# Patient Record
Sex: MALE | Race: WHITE | NOT HISPANIC OR LATINO | Employment: STUDENT | URBAN - METROPOLITAN AREA
[De-identification: names, ages, dates, MRNs, and addresses within clinical notes are randomized per-mention and may not be internally consistent; named-entity substitution may affect disease eponyms.]

---
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
## 2023-09-18 NOTE — PROGRESS NOTES
Subjective:       Patient ID: Alcon Hernandez is a 18 y.o. male.    Chief Complaint: scalp scaling     HPI   Patient was seen in August for scalp scaling tinea capitis tat was diagnosed during this visit and he was started on fluconazole with no relief. He has had this for around a year with no resolution with antifungal shampoos. He has white scaling diffusely spread around his head with no scales on his forehead or face. This scaling has worsened and fluconazole did not help. He has no drainage or boggy areas. No hair loss.        PMHx: denies  Allergies: NKDA  Meds: denies  FHx: denies  SHx: Denies EtoH, illicit drug use and Tobacco abuse.      Review of Systems  as per HPI   Objective:      Vitals:    10/08/18 1607   BP: 102/68   Pulse: 77     Physical Exam   Constitutional: He appears well-developed and well-nourished.   HENT:   Head: Atraumatic.   Scalp white matted scales diffusely spread.    Eyes: Conjunctivae and EOM are normal. Pupils are equal, round, and reactive to light.   Neck: Normal range of motion. Neck supple.   Cardiovascular: Normal rate, regular rhythm and normal heart sounds.   Pulmonary/Chest: Effort normal and breath sounds normal.   Abdominal: Soft. Bowel sounds are normal.   Skin: Skin is warm. Capillary refill takes less than 2 seconds.   Excoriations noted throughout.        Assessment:       1. Seborrheic dermatitis of scalp      Plan:       Alcon was seen today for follow-up.    Diagnoses and all orders for this visit:    Seborrheic dermatitis of scalp  -     ketoconazole (NIZORAL) 2 % shampoo; Apply topically twice a week.  -     Ambulatory referral to Dermatology  Clobetasol solution phoned in to pharmacy     RTC 2 weeks     Eliseo Carty MD  [facility]   10/08/2018      
I assume primary medical responsibility for this patient, I have reviewed the case history, findings, diagnosis and treatment plan with the resident and agree that the care is reasonable and necessary. This service has been performed by a resident without the presence of a teaching physician under the primary care exception.  See below addendum for my evaluation and additional findings.      
Alert and oriented to person, place and time